# Patient Record
Sex: FEMALE | Race: WHITE | NOT HISPANIC OR LATINO | URBAN - METROPOLITAN AREA
[De-identification: names, ages, dates, MRNs, and addresses within clinical notes are randomized per-mention and may not be internally consistent; named-entity substitution may affect disease eponyms.]

---

## 2017-03-30 ENCOUNTER — IMPORTED ENCOUNTER (OUTPATIENT)
Dept: URBAN - METROPOLITAN AREA CLINIC 50 | Facility: CLINIC | Age: 72
End: 2017-03-30

## 2017-04-03 ENCOUNTER — IMPORTED ENCOUNTER (OUTPATIENT)
Dept: URBAN - METROPOLITAN AREA CLINIC 50 | Facility: CLINIC | Age: 72
End: 2017-04-03

## 2018-04-06 ENCOUNTER — IMPORTED ENCOUNTER (OUTPATIENT)
Dept: URBAN - METROPOLITAN AREA CLINIC 50 | Facility: CLINIC | Age: 73
End: 2018-04-06

## 2018-04-25 ENCOUNTER — IMPORTED ENCOUNTER (OUTPATIENT)
Dept: URBAN - METROPOLITAN AREA CLINIC 50 | Facility: CLINIC | Age: 73
End: 2018-04-25

## 2019-04-05 ENCOUNTER — IMPORTED ENCOUNTER (OUTPATIENT)
Dept: URBAN - METROPOLITAN AREA CLINIC 50 | Facility: CLINIC | Age: 74
End: 2019-04-05

## 2019-04-05 NOTE — PATIENT DISCUSSION
"""suspect based on c/d ratio. IOP controlled OU. Continue without gtts. Sched HVF/OCT OU soon.  """

## 2019-04-11 ENCOUNTER — IMPORTED ENCOUNTER (OUTPATIENT)
Dept: URBAN - METROPOLITAN AREA CLINIC 50 | Facility: CLINIC | Age: 74
End: 2019-04-11

## 2019-05-10 ENCOUNTER — IMPORTED ENCOUNTER (OUTPATIENT)
Dept: URBAN - METROPOLITAN AREA CLINIC 50 | Facility: CLINIC | Age: 74
End: 2019-05-10

## 2019-11-15 ENCOUNTER — IMPORTED ENCOUNTER (OUTPATIENT)
Dept: URBAN - METROPOLITAN AREA CLINIC 50 | Facility: CLINIC | Age: 74
End: 2019-11-15

## 2020-06-11 ENCOUNTER — IMPORTED ENCOUNTER (OUTPATIENT)
Dept: URBAN - METROPOLITAN AREA CLINIC 50 | Facility: CLINIC | Age: 75
End: 2020-06-11

## 2020-06-12 ENCOUNTER — IMPORTED ENCOUNTER (OUTPATIENT)
Dept: URBAN - METROPOLITAN AREA CLINIC 50 | Facility: CLINIC | Age: 75
End: 2020-06-12

## 2020-09-17 ENCOUNTER — IMPORTED ENCOUNTER (OUTPATIENT)
Dept: URBAN - METROPOLITAN AREA CLINIC 50 | Facility: CLINIC | Age: 75
End: 2020-09-17

## 2021-04-18 ASSESSMENT — TONOMETRY
OS_IOP_MMHG: 16
OD_IOP_MMHG: 16
OS_IOP_MMHG: 14
OS_IOP_MMHG: 14
OD_IOP_MMHG: 16
OS_IOP_MMHG: 14
OD_IOP_MMHG: 13
OD_IOP_MMHG: 10
OD_IOP_MMHG: 14
OD_IOP_MMHG: 15
OD_IOP_MMHG: 16
OD_IOP_MMHG: 14
OS_IOP_MMHG: 16
OS_IOP_MMHG: 15
OS_IOP_MMHG: 16
OS_IOP_MMHG: 15
OS_IOP_MMHG: 13
OD_IOP_MMHG: 15
OS_IOP_MMHG: 15
OD_IOP_MMHG: 11

## 2021-04-18 ASSESSMENT — VISUAL ACUITY
OD_CC: 20/25
OS_CC: 20/25-1
OD_CC: 20/25-1
OS_BAT: 20/20
OD_CC: 20/20
OS_CC: 20/25
OD_BAT: 20/20
OD_CC: 20/25
OD_CC: J1+@ 17 IN
OS_CC: 20/50
OS_CC: 20/25
OS_OTHER: 20/20. 20/25.
OS_CC: J1+
OD_CC: J1+
OD_CC: 20/40
OD_CC: J1+
OS_CC: J1+@ 17 IN
OD_OTHER: 20/20. 20/20.
OS_CC: J1+
OD_CC: 20/25
OS_CC: 20/25
OS_CC: J1+
OS_CC: 20/25+
OD_CC: J1+

## 2021-04-18 ASSESSMENT — PACHYMETRY
OS_CT_UM: 570
OD_CT_UM: 557
OS_CT_UM: 570
OD_CT_UM: 557
OD_CT_UM: 557
OS_CT_UM: 570
OD_CT_UM: 557
OS_CT_UM: 570

## 2021-11-05 ENCOUNTER — PREPPED CHART (OUTPATIENT)
Dept: URBAN - METROPOLITAN AREA CLINIC 49 | Facility: CLINIC | Age: 76
End: 2021-11-05

## 2022-04-07 ENCOUNTER — COMPREHENSIVE EXAM (OUTPATIENT)
Dept: URBAN - METROPOLITAN AREA CLINIC 49 | Facility: CLINIC | Age: 77
End: 2022-04-07

## 2022-04-07 DIAGNOSIS — H43.813: ICD-10-CM

## 2022-04-07 DIAGNOSIS — E11.9: ICD-10-CM

## 2022-04-07 DIAGNOSIS — H40.1132: ICD-10-CM

## 2022-04-07 DIAGNOSIS — H35.432: ICD-10-CM

## 2022-04-07 DIAGNOSIS — Z01.01: ICD-10-CM

## 2022-04-07 PROCEDURE — 92015 DETERMINE REFRACTIVE STATE: CPT

## 2022-04-07 PROCEDURE — 92014 COMPRE OPH EXAM EST PT 1/>: CPT

## 2022-04-07 RX ORDER — TIMOLOL MALEATE 6.8 MG/ML
1 SOLUTION OPHTHALMIC TWICE A DAY
Start: 2022-04-07

## 2022-04-07 ASSESSMENT — TONOMETRY
OD_IOP_MMHG: 19
OS_IOP_MMHG: 20
OS_IOP_MMHG: 19
OD_IOP_MMHG: 20

## 2022-04-07 ASSESSMENT — VISUAL ACUITY
OU_CC: J1
OS_CC: 20/30-2
OD_CC: 20/20-

## 2022-04-07 NOTE — PATIENT DISCUSSION
Slightly increased IOP OU. Will start additional drop therapy. Start Timolol OU BID. Continue current Latanoprost OU Qhs. Follow up in 1 week for IOP check. Target IOP is 15 OU. Schedule next available HVF/RNFL OCT.

## 2022-04-08 ENCOUNTER — DIAGNOSTICS ONLY (OUTPATIENT)
Dept: URBAN - METROPOLITAN AREA CLINIC 52 | Facility: CLINIC | Age: 77
End: 2022-04-08

## 2022-04-08 DIAGNOSIS — H40.1132: ICD-10-CM

## 2022-04-08 PROCEDURE — 92133 CPTRZD OPH DX IMG PST SGM ON: CPT

## 2022-04-08 PROCEDURE — 92083 EXTENDED VISUAL FIELD XM: CPT

## 2022-04-08 NOTE — PATIENT DISCUSSION
Slightly increased IOP OU. Will start additional drop therapy. Start Timolol OU BID. Continue current Latanoprost OU Qhs. Follow up in 1 week for IOP check. Target IOP is 15 OU.

## 2022-04-12 ENCOUNTER — ESTABLISHED PATIENT (OUTPATIENT)
Dept: URBAN - METROPOLITAN AREA CLINIC 53 | Facility: CLINIC | Age: 77
End: 2022-04-12

## 2022-04-12 DIAGNOSIS — H40.1132: ICD-10-CM

## 2022-04-12 PROCEDURE — 92012 INTRM OPH EXAM EST PATIENT: CPT

## 2022-04-12 ASSESSMENT — VISUAL ACUITY
OS_PH: 20/30-2
OS_CC: 20/40-1
OD_CC: 20/30

## 2022-04-12 ASSESSMENT — TONOMETRY
OS_IOP_MMHG: 14
OD_IOP_MMHG: 15
OD_IOP_MMHG: 15
OD_IOP_MMHG: 16
OD_IOP_MMHG: 16
OS_IOP_MMHG: 16
OS_IOP_MMHG: 15

## 2022-04-12 NOTE — PATIENT DISCUSSION
iop is in target range.  recommend patient continue timolol twice a day in both eyes and latanoprost  at bedtime in both eyes .  will recheck in 6 months.